# Patient Record
Sex: FEMALE | Race: BLACK OR AFRICAN AMERICAN | Employment: OTHER | URBAN - METROPOLITAN AREA
[De-identification: names, ages, dates, MRNs, and addresses within clinical notes are randomized per-mention and may not be internally consistent; named-entity substitution may affect disease eponyms.]

---

## 2017-07-20 ENCOUNTER — OFFICE VISIT (OUTPATIENT)
Dept: SURGERY | Age: 77
End: 2017-07-20

## 2017-07-20 VITALS
BODY MASS INDEX: 33.82 KG/M2 | WEIGHT: 203 LBS | HEART RATE: 76 BPM | SYSTOLIC BLOOD PRESSURE: 134 MMHG | RESPIRATION RATE: 18 BRPM | TEMPERATURE: 97 F | HEIGHT: 65 IN | OXYGEN SATURATION: 98 % | DIASTOLIC BLOOD PRESSURE: 60 MMHG

## 2017-07-20 DIAGNOSIS — Z12.11 ENCOUNTER FOR SCREENING COLONOSCOPY: Primary | ICD-10-CM

## 2017-07-20 RX ORDER — HYDROCHLOROTHIAZIDE 12.5 MG/1
12.5 TABLET ORAL DAILY
COMMUNITY
End: 2019-02-06 | Stop reason: SDUPTHER

## 2017-07-20 RX ORDER — PRAVASTATIN SODIUM 20 MG/1
20 TABLET ORAL
COMMUNITY
End: 2019-02-06 | Stop reason: SDUPTHER

## 2017-07-20 RX ORDER — BUDESONIDE AND FORMOTEROL FUMARATE DIHYDRATE 160; 4.5 UG/1; UG/1
2 AEROSOL RESPIRATORY (INHALATION) 2 TIMES DAILY
COMMUNITY
End: 2019-05-20 | Stop reason: SDUPTHER

## 2017-07-20 RX ORDER — POLYETHYLENE GLYCOL 3350, SODIUM SULFATE ANHYDROUS, SODIUM BICARBONATE, SODIUM CHLORIDE, POTASSIUM CHLORIDE 236; 22.74; 6.74; 5.86; 2.97 G/4L; G/4L; G/4L; G/4L; G/4L
POWDER, FOR SOLUTION ORAL
Qty: 1 BOTTLE | Refills: 0 | Status: SHIPPED | OUTPATIENT
Start: 2017-07-20 | End: 2019-01-15

## 2017-07-20 RX ORDER — ALBUTEROL SULFATE 90 UG/1
AEROSOL, METERED RESPIRATORY (INHALATION)
COMMUNITY
End: 2019-05-20 | Stop reason: SDUPTHER

## 2017-07-20 NOTE — PROGRESS NOTES
Colon Screen    Patient: Toni Perez MRN: [id number]  SSN: [id number]    YOB: 1940  Age: 77 y.o. Sex: female        Subjective:   Toni Perez presents for colon screening. PCP is Venessa Dover MD. Patient denies rectal pain or bleeding. Abdominal surgeries as described below, specifically cholecystectomy. Patient has a bowel movement 1-2 per day. Patient has intentionally lost 30 over 2 years. She denies changes in bowel habits. Patient has no known family history of colon cancer. Last colonoscopy was 5 years at [facility]. There were no polyps found on that exam, however she did have polyps on a previous colonoscopy. Allergies   Allergen Reactions    Atenolol Shortness of Breath    Pcn [Penicillins] Rash    Vicodin [Hydrocodone-Acetaminophen] Nausea Only       Past Medical History:   Diagnosis Date    Arthritis     Calculus of kidney     HTN (hypertension)     Hypercholesterolemia      Past Surgical History:   Procedure Laterality Date    HX CHOLECYSTECTOMY      HX COLONOSCOPY      HX WISDOM TEETH EXTRACTION  1980's      Family History   Problem Relation Age of Onset    Hypertension Brother      Social History   Substance Use Topics    Smoking status: Former Smoker     Quit date: 1/1/1994    Smokeless tobacco: Never Used    Alcohol use No      Prior to Admission medications    Medication Sig Start Date End Date Taking? Authorizing Provider   albuterol (PROAIR HFA) 90 mcg/actuation inhaler Take  by inhalation. Yes Historical Provider   pravastatin (PRAVACHOL) 20 mg tablet Take 20 mg by mouth nightly. Yes Historical Provider   budesonide-formoterol (SYMBICORT) 160-4.5 mcg/actuation HFA inhaler Take 2 Puffs by inhalation two (2) times a day. Yes Historical Provider   hydroCHLOROthiazide (HYDRODIURIL) 12.5 mg tablet Take 12.5 mg by mouth daily.    Yes Historical Provider   ergocalciferol, vitamin d2, 50,000 unit Tab Take  by mouth every seven (7) days.   Yes Historical Provider   alendronate-vitamin d3 70-2,800 mg-unit per tablet Take 1 Tab by mouth every seven (7) days. Yes Historical Provider   aspirin 81 mg chewable tablet Take 81 mg by mouth daily. Patient took last dose March 04, 2013 and will her surgeon's instruction not to take it anymore before surgery. Yes Historical Provider   calcium-cholecalciferol, d3, (CALCIUM 600 + D) 600-125 mg-unit Tab Take  by mouth. Yes Historical Provider   omega-3 fatty acids-vitamin e (FISH OIL) 1,000 mg cap Take 1 Cap by mouth. Patient took last dose March 04, 2013 and will her surgeon's instruction not to take it anymore before surgery. Yes Historical Provider   multivitamin (ONE A DAY) tablet Take 1 Tab by mouth daily. Yes Historical Provider   ascorbic acid (VITAMIN C) 500 mg tablet Take  by mouth. Yes Historical Provider   acetaminophen (TYLENOL) 325 mg tablet Take  by mouth every four (4) hours as needed. Yes Historical Provider   promethazine (PHENERGAN) 25 mg tablet Take 1 Tab by mouth every six (6) hours as needed for Nausea. 3/11/13   Wade Cantu MD   diltiazem CD (DILT-CD) 240 mg ER capsule Take 240 mg by mouth daily. Patient takes medicine daily every morning. Instructed pt to take this medicine early AM on March 11, 2013 with a small sip of water. Indications: HYPERTENSION    Historical Provider   simvastatin (ZOCOR) 20 mg tablet Take  by mouth nightly. Historical Provider          Review of Systems:  Gastrointestinal: negative      Risks colonoscopy described- colon injury, missed lesion, anesthesia problems, bleeding. Colonoscopy preparation reviewed in detail with patient and a copy of the instructions was provided to the patient.        Aishwarya Amador LPN  July 20, 0628  1:37 AM

## 2017-07-27 ENCOUNTER — HOSPITAL ENCOUNTER (OUTPATIENT)
Age: 77
Setting detail: OUTPATIENT SURGERY
Discharge: HOME | End: 2017-07-27
Attending: COLON & RECTAL SURGERY | Admitting: COLON & RECTAL SURGERY
Payer: MEDICARE

## 2017-07-27 VITALS
BODY MASS INDEX: 32.99 KG/M2 | TEMPERATURE: 99.3 F | HEIGHT: 65 IN | DIASTOLIC BLOOD PRESSURE: 67 MMHG | HEART RATE: 70 BPM | OXYGEN SATURATION: 95 % | WEIGHT: 198 LBS | RESPIRATION RATE: 21 BRPM | SYSTOLIC BLOOD PRESSURE: 130 MMHG

## 2017-07-27 PROCEDURE — 77030034690 HC DEV ENDO SNR RETRV STRC -B: Performed by: COLON & RECTAL SURGERY

## 2017-07-27 PROCEDURE — 74011250636 HC RX REV CODE- 250/636

## 2017-07-27 PROCEDURE — 74011250636 HC RX REV CODE- 250/636: Performed by: COLON & RECTAL SURGERY

## 2017-07-27 PROCEDURE — 88305 TISSUE EXAM BY PATHOLOGIST: CPT | Performed by: COLON & RECTAL SURGERY

## 2017-07-27 PROCEDURE — 99152 MOD SED SAME PHYS/QHP 5/>YRS: CPT | Performed by: COLON & RECTAL SURGERY

## 2017-07-27 PROCEDURE — 76040000019: Performed by: COLON & RECTAL SURGERY

## 2017-07-27 PROCEDURE — 77030031670 HC DEV INFL ENDOTEK BIG60 MRTM -B: Performed by: COLON & RECTAL SURGERY

## 2017-07-27 RX ORDER — NALOXONE HYDROCHLORIDE 0.4 MG/ML
0.4 INJECTION, SOLUTION INTRAMUSCULAR; INTRAVENOUS; SUBCUTANEOUS
Status: CANCELLED | OUTPATIENT
Start: 2017-07-27 | End: 2017-07-27

## 2017-07-27 RX ORDER — SODIUM CHLORIDE 0.9 % (FLUSH) 0.9 %
5-10 SYRINGE (ML) INJECTION EVERY 8 HOURS
Status: CANCELLED | OUTPATIENT
Start: 2017-07-27 | End: 2017-07-27

## 2017-07-27 RX ORDER — MIDAZOLAM HYDROCHLORIDE 1 MG/ML
INJECTION, SOLUTION INTRAMUSCULAR; INTRAVENOUS
Status: DISCONTINUED
Start: 2017-07-27 | End: 2017-07-27 | Stop reason: HOSPADM

## 2017-07-27 RX ORDER — DEXTROMETHORPHAN/PSEUDOEPHED 2.5-7.5/.8
1.2 DROPS ORAL
Status: CANCELLED | OUTPATIENT
Start: 2017-07-27

## 2017-07-27 RX ORDER — TRAMADOL HYDROCHLORIDE 50 MG/1
TABLET ORAL
Refills: 0 | COMMUNITY
Start: 2017-06-29

## 2017-07-27 RX ORDER — SODIUM CHLORIDE 9 MG/ML
25 INJECTION, SOLUTION INTRAVENOUS CONTINUOUS
Status: CANCELLED | OUTPATIENT
Start: 2017-07-27

## 2017-07-27 RX ORDER — MIDAZOLAM HYDROCHLORIDE 1 MG/ML
.25-5 INJECTION, SOLUTION INTRAMUSCULAR; INTRAVENOUS
Status: CANCELLED | OUTPATIENT
Start: 2017-07-27 | End: 2017-07-27

## 2017-07-27 RX ORDER — MIDAZOLAM HYDROCHLORIDE 5 MG/ML
INJECTION INTRAMUSCULAR; INTRAVENOUS AS NEEDED
Status: DISCONTINUED | OUTPATIENT
Start: 2017-07-27 | End: 2017-07-27 | Stop reason: HOSPADM

## 2017-07-27 RX ORDER — ATROPINE SULFATE 0.1 MG/ML
0.5 INJECTION INTRAVENOUS
Status: CANCELLED | OUTPATIENT
Start: 2017-07-27 | End: 2017-07-27

## 2017-07-27 RX ORDER — SODIUM CHLORIDE 0.9 % (FLUSH) 0.9 %
5-10 SYRINGE (ML) INJECTION AS NEEDED
Status: CANCELLED | OUTPATIENT
Start: 2017-07-27 | End: 2017-07-27

## 2017-07-27 RX ORDER — FLUMAZENIL 0.1 MG/ML
0.2 INJECTION INTRAVENOUS
Status: CANCELLED | OUTPATIENT
Start: 2017-07-27 | End: 2017-07-27

## 2017-07-27 RX ORDER — EPINEPHRINE 0.1 MG/ML
1 INJECTION INTRACARDIAC; INTRAVENOUS
Status: CANCELLED | OUTPATIENT
Start: 2017-07-27 | End: 2017-07-27

## 2017-07-27 RX ORDER — SODIUM CHLORIDE 9 MG/ML
75 INJECTION, SOLUTION INTRAVENOUS CONTINUOUS
Status: CANCELLED | OUTPATIENT
Start: 2017-07-27 | End: 2017-07-27

## 2017-07-27 NOTE — DISCHARGE INSTRUCTIONS
Colonoscopy Discharge Instructions       Zaria Perez  [id number]  1940      COLONOSCOPY FINDINGS:  Your colonoscopy showed: 1. One colon polyp which was completely removed. 2. Otherwise normal examination. FOLLOW UP RECOMMENDATIONS:   Dr. Simone Magallanes will contact you with your results. Dr. Simone Magallanes will recommend your next colonoscopy after the Pathology results are available. DISCOMFORT:  If you have redness at your IV site- apply warm compress to area; if redness or soreness persist- contact your physician  There may be a slight amount of blood passed from the rectum, more than a teaspoon of bright red blood is not expected - contact your physician  Gaseous discomfort is common- walking, belching will help relieve any gas pains. If discomfort persist- contact your physician    DIET:   Regular diet. ACTIVITY:  You may resume your normal daily activities, however, it is recommended that you spend the remainder of the day resting - avoiding any strenuous activities. You may not operate a vehicle for 24 hours  You may not engage in an occupation involving machinery or appliances for rest of today  You may not drink alcoholic beverages for at least 24 hours  Avoid making any critical decisions for at least 24 hour    CALL M.D. ANY SIGN OF:   Increasing pain, nausea, vomiting  Abdominal distension (swelling)  New increased bleeding   Fever or chills  Pain in chest area or shortness of breath      Aleena Leal MD, FACS, FASCRS  Colon and Rectal Surgery  [facility]  Office [phone]  Fax     [phone]         Colonoscopy: What to Expect at Home  Your Recovery  After you have a colonoscopy, you will stay at the clinic for 1 to 2 hours until the medicines wear off. Then you can go home. But you will need to arrange for a ride. Your doctor will tell you when you can eat and do your other usual activities.   Your doctor will talk to you about when you will need your next colonoscopy. Your doctor can help you decide how often you need to be checked. This will depend on the results of your test and your risk for colorectal cancer. After the test, you may be bloated or have gas pains. You may need to pass gas. If a biopsy was done or a polyp was removed, you may have streaks of blood in your stool (feces) for a few days. This care sheet gives you a general idea about how long it will take for you to recover. But each person recovers at a different pace. Follow the steps below to get better as quickly as possible. How can you care for yourself at home? Activity  · Rest when you feel tired. · You can do your normal activities when it feels okay to do so. Diet  · Follow your doctor's directions for eating. · Unless your doctor has told you not to, drink plenty of fluids. This helps to replace the fluids that were lost during the colon prep. · Do not drink alcohol. Medicines  · Your doctor will tell you if and when you can restart your medicines. He or she will also give you instructions about taking any new medicines. · If you take blood thinners, such as warfarin (Coumadin), clopidogrel (Plavix), or aspirin, be sure to talk to your doctor. He or she will tell you if and when to start taking those medicines again. Make sure that you understand exactly what your doctor wants you to do. · If polyps were removed or a biopsy was done during the test, your doctor may tell you not to take aspirin or other anti-inflammatory medicines for a few days. These include ibuprofen (Advil, Motrin) and naproxen (Aleve). Other instructions  · For your safety, do not drive or operate machinery until the medicine wears off and you can think clearly. Your doctor may tell you not to drive or operate machinery until the day after your test.  · Do not sign legal documents or make major decisions until the medicine wears off and you can think clearly.  The anesthesia can make it hard for you to fully understand what you are agreeing to. Follow-up care is a key part of your treatment and safety. Be sure to make and go to all appointments, and call your doctor if you are having problems. It's also a good idea to know your test results and keep a list of the medicines you take. When should you call for help? Call 911 anytime you think you may need emergency care. For example, call if:  · You passed out (lost consciousness). · You pass maroon or bloody stools. · You have severe belly pain. Call your doctor now or seek immediate medical care if:  · Your stools are black and tarlike. · Your stools have streaks of blood, but you did not have a biopsy or any polyps removed. · You have belly pain, or your belly is swollen and firm. · You vomit. · You have a fever. · You are very dizzy. Watch closely for changes in your health, and be sure to contact your doctor if you have any problems. Where can you learn more? Go to http://bao-humza.info/. Enter E264 in the search box to learn more about \"Colonoscopy: What to Expect at Home. \"  Current as of: August 9, 2016  Content Version: 11.3  © 3395-7931 [employer]. Care instructions adapted under license by [employer] (which disclaims liability or warranty for this information). If you have questions about a medical condition or this instruction, always ask your healthcare professional. [place] [address] any warranty or liability for your use of this information.     DISCHARGE SUMMARY from Nurse    The following personal items are in your possession at time of discharge:    Dental Appliances: Lowers, Uppers  Visual Aid: Glasses, With patient                            PATIENT INSTRUCTIONS:    After general anesthesia or intravenous sedation, for 24 hours or while taking prescription Narcotics:  · Limit your activities  · Do not drive and operate hazardous machinery  · Do not make important personal or business decisions  · Do  not drink alcoholic beverages  · If you have not urinated within 8 hours after discharge, please contact your surgeon on call. Report the following to your surgeon:  · Excessive pain, swelling, redness or odor of or around the surgical area  · Temperature over 100.5  · Nausea and vomiting lasting longer than 4 hours or if unable to take medications  · Any signs of decreased circulation or nerve impairment to extremity: change in color, persistent  numbness, tingling, coldness or increase pain  · Any questions        What to do at Home:  These are general instructions for a healthy lifestyle:    No smoking/ No tobacco products/ Avoid exposure to second hand smoke    Surgeon General's Warning:  Quitting smoking now greatly reduces serious risk to your health. Obesity, smoking, and sedentary lifestyle greatly increases your risk for illness    A healthy diet, regular physical exercise & weight monitoring are important for maintaining a healthy lifestyle    You may be retaining fluid if you have a history of heart failure or if you experience any of the following symptoms:  Weight gain of 3 pounds or more overnight or 5 pounds in a week, increased swelling in our hands or feet or shortness of breath while lying flat in bed. Please call your doctor as soon as you notice any of these symptoms; do not wait until your next office visit. Recognize signs and symptoms of STROKE:    F-face looks uneven    A-arms unable to move or move unevenly    S-speech slurred or non-existent    T-time-call 911 as soon as signs and symptoms begin-DO NOT go       Back to bed or wait to see if you get better-TIME IS BRAIN. Warning Signs of HEART ATTACK     Call 911 if you have these symptoms:   Chest discomfort. Most heart attacks involve discomfort in the center of the chest that lasts more than a few minutes, or that goes away and comes back.  It can feel like uncomfortable pressure, squeezing, fullness, or pain.  Discomfort in other areas of the upper body. Symptoms can include pain or discomfort in one or both arms, the back, neck, jaw, or stomach.  Shortness of breath with or without chest discomfort.  Other signs may include breaking out in a cold sweat, nausea, or lightheadedness. Don't wait more than five minutes to call 911 - MINUTES MATTER! Fast action can save your life. Calling 911 is almost always the fastest way to get lifesaving treatment. Emergency Medical Services staff can begin treatment when they arrive -- up to an hour sooner than if someone gets to the hospital by car. The discharge information has been reviewed with the patient. The patient verbalized understanding. Discharge medications reviewed with the patient and appropriate educational materials and side effects teaching were provided. Patient armband removed and given to patient to take home.   Patient was informed of the privacy risks if armband lost or stolen

## 2017-07-27 NOTE — H&P
[facility]  [address] [address], [facility]        Colon and Rectal Surgery History and Physical    Subjective:      Ruslan Perez is a 77 y.o. female who presents for colon screening. PCP is Deborah Moreira MD. Patient denies rectal pain or bleeding. Abdominal surgeries as described below, specifically cholecystectomy. Patient has a bowel movement 1-2 per day. Patient has intentionally lost 30 over 2 years. She denies changes in bowel habits. Patient has no known family history of colon cancer.      Last colonoscopy was 5 years at [facility]. There were no polyps found on that exam, however she did have polyps on a previous colonoscopy. Patient Active Problem List    Diagnosis Date Noted    Cholelithiasis 03/12/2013    Gallbladder attack 01/23/2013     Past Medical History:   Diagnosis Date    Arthritis     Calculus of kidney     HTN (hypertension)     Hypercholesterolemia       Past Surgical History:   Procedure Laterality Date    HX CHOLECYSTECTOMY      HX COLONOSCOPY      HX WISDOM TEETH EXTRACTION  1980's      Social History   Substance Use Topics    Smoking status: Former Smoker     Quit date: 1/1/1994    Smokeless tobacco: Never Used    Alcohol use No      Family History   Problem Relation Age of Onset    Hypertension Brother       Prior to Admission medications    Medication Sig Start Date End Date Taking? Authorizing Provider   pravastatin (PRAVACHOL) 20 mg tablet Take 20 mg by mouth nightly. Yes Historical Provider   budesonide-formoterol (SYMBICORT) 160-4.5 mcg/actuation HFA inhaler Take 2 Puffs by inhalation two (2) times a day. Yes Historical Provider   hydroCHLOROthiazide (HYDRODIURIL) 12.5 mg tablet Take 12.5 mg by mouth daily.    Yes Historical Provider   PEG 3350-Electrolytes (GO-LYTELY) 024-12.51-6.95 -5.86 gram suspension Take as directed for bowel prep  Indications: BOWEL EVACUATION 7/20/17  Yes Mary Carmen North MD diltiazem CD (DILT-CD) 240 mg ER capsule Take 240 mg by mouth daily. Patient takes medicine daily every morning. Instructed pt to take this medicine early AM on March 11, 2013 with a small sip of water. Indications: HYPERTENSION   Yes Historical Provider   aspirin 81 mg chewable tablet Take 81 mg by mouth daily. Patient took last dose March 04, 2013 and will her surgeon's instruction not to take it anymore before surgery. Yes Historical Provider   traMADol (ULTRAM) 50 mg tablet take 1 tablet by mouth once daily if needed 6/29/17   Historical Provider   albuterol (PROAIR HFA) 90 mcg/actuation inhaler Take  by inhalation. Historical Provider   promethazine (PHENERGAN) 25 mg tablet Take 1 Tab by mouth every six (6) hours as needed for Nausea. 3/11/13   Marylin Berg MD   alendronate-vitamin d3 45-2,834 mg-unit per tablet Take 1 Tab by mouth every seven (7) days. Historical Provider   calcium-cholecalciferol, d3, (CALCIUM 600 + D) 600-125 mg-unit Tab Take  by mouth. Historical Provider   omega-3 fatty acids-vitamin e (FISH OIL) 1,000 mg cap Take 1 Cap by mouth. Patient took last dose March 04, 2013 and will her surgeon's instruction not to take it anymore before surgery. Historical Provider   multivitamin (ONE A DAY) tablet Take 1 Tab by mouth daily. Historical Provider   ascorbic acid (VITAMIN C) 500 mg tablet Take  by mouth. Historical Provider   acetaminophen (TYLENOL) 325 mg tablet Take  by mouth every four (4) hours as needed.       Historical Provider     Current Facility-Administered Medications   Medication Dose Route Frequency    midazolam (VERSED) 1 mg/mL injection        meperidine (DEMEROL) 100 mg/ml injection         Allergies   Allergen Reactions    Atenolol Shortness of Breath    Pcn [Penicillins] Rash    Vicodin [Hydrocodone-Acetaminophen] Nausea Only            Objective:     Visit Vitals    /77    Pulse 79    Temp 99.3 °F (37.4 °C)    Resp 16    Ht 5' 5\" (1.651 m)    Wt 89.8 kg (198 lb)    SpO2 95%    Breastfeeding No    BMI 32.95 kg/m2        Physical Exam:   GENERAL: alert, cooperative, no distress, appears stated age  LUNG: clear to auscultation bilaterally  HEART: regular rate and rhythm, S1, S2 normal, no murmur, click, rub or gallop  ABDOMEN: soft, non-tender. Bowel sounds normal. No masses,  no organomegaly       Assessment:     Antonio Perez is a 77 y.o. female who requires colonoscopy for   Personal history of colon polyps (screening only). Plan:     1. I recommend proceeding with colonoscopy. The patient was in full agreement and was eager to proceed. 2. I discussed the details of the colonoscopy procedure. The risks of colonoscopy were discussed including colon injury/perforation, anesthesia issues, bleeding, and the possibility of incomplete examination. The patient was willing to accept these risks and proceed with the examination. All questions were answered to the patient's satisfaction.          Parth Muñoz MD, FACS, FASCRS  Colon and Rectal Surgery  [facility]  Office [phone]  Fax     [phone]  7/27/2017  12:05 PM

## 2017-07-27 NOTE — PROCEDURES
Colonoscopy Procedure Note      Geremias Perez  1940  [id number]                Date of Procedure: 7/27/2017    Indications:    Personal history of colon polyps (screening only)     Preoperative diagnosis: COLON CANCER SCREENING Z12.11      Postoperative diagnosis: polyp    Title of Procedure:  Colonoscopy with polyopectomy    :  Monica Booker MD    Assistant(s): Endoscopy Technician-1: Quirino Michaels  Endoscopy RN-1: Kimberly Montenegro RN    Referring Source:  Aaron Ricketts MD    Sedation:  Demerol 50 mg IV,  Versed 4 mg IV      ASA Class: ASA 3 - Severe systemic disease but compensated        Procedure Details:    Prior to the procedure, a history and physical were performed. The patients medications, allergies and sensitivities were reviewed and all questions were answered. After informed consent was obtained for the procedure, with all risks and benefits of procedure explained. The patient was taken to the endoscopy suite and placed in the left lateral decubitus position. Patient identification and proposed procedure were verified prior to the procedure by the nurse and I. Following the  satisfactory administration of sedation,  the anus was inspected and appeared within normal limits. Digital rectal examination revealed Normal sphincter tone and squeeze pressure. Palpation revealed No Masses. Next the Olympus video colonoscope was introduced through the anus and advanced to cecum, which was identified by the ileocecal valve and appendiceal orifice, terminal ileum. The quality of preparation was good. The terminal ileum was visualized. The colonoscope was then slowly withdrawn and the mucosa carefully examined for any abnormalities. Cecal withdrawl time was greater than six minutes. The patient tolerated the procedure well.       Findings:   Rectum: normal  Sigmoid: normal  Descending Colon: 1  Pedunculated polyp(s), the largest 5 mm in size;  Transverse Colon: normal  Ascending Colon: normal  Cecum: normal  Terminal Ileum: normal    Interventions:  1 complete polypectomy was performed using hot snare  and the polyp was retrieved    Specimen Removed:   ID Type Source Tests Collected by Time Destination   1 : (hot snare) polyp Preservative Colon, Descending  Valarie Malcolm MD 7/27/2017 1327 Pathology        Complications: None. EBL:  None. Impression:    polyp     Recommendations: -Await pathology. Resume normal medication(s). Discharge Disposition:  Home in the company of a  when able to ambulate.         Valarie Malcolm MD, FACS, FASCRS  Colon and Rectal Surgery  [facility]  Office [phone]  Fax     [phone]  7/27/2017  1:45 PM       [facility]  [address] [place] [address]

## 2018-10-15 ENCOUNTER — HOSPITAL ENCOUNTER (OUTPATIENT)
Dept: MAMMOGRAPHY | Age: 78
Discharge: HOME | End: 2018-10-15
Payer: MEDICARE

## 2018-10-15 DIAGNOSIS — Z12.31 VISIT FOR SCREENING MAMMOGRAM: ICD-10-CM

## 2018-10-15 PROCEDURE — 77063 BREAST TOMOSYNTHESIS BI: CPT

## 2019-01-14 ENCOUNTER — DOCUMENTATION ONLY (OUTPATIENT)
Dept: INTERNAL MEDICINE CLINIC | Age: 79
End: 2019-01-14

## 2019-01-14 NOTE — PROGRESS NOTES
Pharmacy Note: Immunization Update    Patient: Zaria Perez (80 y. o., 1940)     Patient's immunization history was updated to reflect information contained in the [place] and/or outside immunization/pharmacy records were reconciled within [facility]. Health Maintenance schedule updated when appropriate.     Current immunizations now reflect:       Immunization History   Administered Date(s) Administered    Influenza High Dose Vaccine PF 08/16/2016    Pneumococcal Conjugate (PCV-13) 08/16/2016    Pneumococcal Polysaccharide (PPSV-23) 12/31/2017       Chelo Bean, PharmD, BCACP

## 2019-01-15 ENCOUNTER — OFFICE VISIT (OUTPATIENT)
Dept: INTERNAL MEDICINE CLINIC | Age: 79
End: 2019-01-15

## 2019-01-15 VITALS
DIASTOLIC BLOOD PRESSURE: 78 MMHG | RESPIRATION RATE: 18 BRPM | SYSTOLIC BLOOD PRESSURE: 142 MMHG | HEIGHT: 65 IN | WEIGHT: 203.4 LBS | BODY MASS INDEX: 33.89 KG/M2 | HEART RATE: 77 BPM | OXYGEN SATURATION: 96 % | TEMPERATURE: 97.1 F

## 2019-01-15 DIAGNOSIS — M25.561 CHRONIC PAIN OF BOTH KNEES: ICD-10-CM

## 2019-01-15 DIAGNOSIS — M81.0 AGE-RELATED OSTEOPOROSIS WITHOUT CURRENT PATHOLOGICAL FRACTURE: ICD-10-CM

## 2019-01-15 DIAGNOSIS — M25.562 CHRONIC PAIN OF BOTH KNEES: ICD-10-CM

## 2019-01-15 DIAGNOSIS — I10 ESSENTIAL HYPERTENSION: Primary | ICD-10-CM

## 2019-01-15 DIAGNOSIS — M79.671 HEEL PAIN, BILATERAL: ICD-10-CM

## 2019-01-15 DIAGNOSIS — G47.9 SLEEP DISORDER: ICD-10-CM

## 2019-01-15 DIAGNOSIS — J44.9 CHRONIC OBSTRUCTIVE PULMONARY DISEASE, UNSPECIFIED COPD TYPE (HCC): ICD-10-CM

## 2019-01-15 DIAGNOSIS — G89.29 CHRONIC PAIN OF BOTH KNEES: ICD-10-CM

## 2019-01-15 DIAGNOSIS — M79.672 HEEL PAIN, BILATERAL: ICD-10-CM

## 2019-01-15 DIAGNOSIS — E78.2 MIXED HYPERLIPIDEMIA: ICD-10-CM

## 2019-01-15 NOTE — PROGRESS NOTES
HISTORY OF PRESENT ILLNESS Bee Perez is a 69 y.o. female. 70 yo female here to establish care, f/u of HTN. Energy levels decreased some over past year. Worse on days when she is busy. Wakes refreshed but does notes she only sleeps 3 hours a night chronically. Wakes early 3am but does watch tv at night. Has been taking B12. Has not noted this doing much. Reports recent labs at [facility] but does not know results. HTN BP well controlled at home BP usually 130/70. Arthritis knees. Seldom use of tramadol Has heel pain - new sx, worse when she first gets out of bed but better when she is up walking. COPD - stable on symbicort. OP - last DEXA last year at [facility]. HLD: on pravachol . Labs down last month and she reports this has been controlled. Establish Care Pertinent negatives include no chest pain, no headaches and no shortness of breath. Review of Systems Constitutional: Negative for chills, fever and weight loss. HENT: Negative for congestion and ear pain. Eyes: Negative for blurred vision and pain. Respiratory: Negative for cough and shortness of breath. Cardiovascular: Negative for chest pain, palpitations and leg swelling. Gastrointestinal: Negative for nausea and vomiting. Genitourinary: Negative for frequency and urgency. Musculoskeletal: Positive for joint pain. Negative for myalgias. Skin: Negative for itching and rash. Neurological: Negative for dizziness, tingling and headaches. Psychiatric/Behavioral: Negative for depression. The patient is not nervous/anxious. Past Medical History:  
Diagnosis Date  Arthritis  Calculus of kidney  HTN (hypertension)  Hypercholesterolemia  Menopause Past Surgical History:  
Procedure Laterality Date  COLONOSCOPY N/A 7/27/2017 COLONOSCOPY performed by Ginette Orlando MD at [address]   HX COLONOSCOPY    
 HX WISDOM TEETH EXTRACTION  1980's Current Outpatient Medications on File Prior to Visit Medication Sig Dispense Refill  traMADol (ULTRAM) 50 mg tablet take 1 tablet by mouth once daily if needed  0  
 albuterol (PROAIR HFA) 90 mcg/actuation inhaler Take  by inhalation.  pravastatin (PRAVACHOL) 20 mg tablet Take 20 mg by mouth nightly.  budesonide-formoterol (SYMBICORT) 160-4.5 mcg/actuation HFA inhaler Take 2 Puffs by inhalation two (2) times a day.  hydroCHLOROthiazide (HYDRODIURIL) 12.5 mg tablet Take 12.5 mg by mouth daily.  diltiazem CD (DILT-CD) 240 mg ER capsule Take 240 mg by mouth daily. Patient takes medicine daily every morning. Instructed pt to take this medicine early AM on 2013 with a small sip of water. Indications: HYPERTENSION  alendronate-vitamin d3 70-2,800 mg-unit per tablet Take 1 Tab by mouth every seven (7) days.  aspirin 81 mg chewable tablet Take 81 mg by mouth daily. Patient took last dose 2013 and will her surgeon's instruction not to take it anymore before surgery.  calcium-cholecalciferol, d3, (CALCIUM 600 + D) 600-125 mg-unit Tab Take  by mouth.  acetaminophen (TYLENOL) 325 mg tablet Take  by mouth every four (4) hours as needed. No current facility-administered medications on file prior to visit. Allergies Allergen Reactions  Atenolol Shortness of Breath  Pcn [Penicillins] Rash  Vicodin [Hydrocodone-Acetaminophen] Nausea Only Family History Problem Relation Age of Onset  Hypertension Brother Social History Tobacco Use  Smoking status: Former Smoker Last attempt to quit: 1994 Years since quittin.0  Smokeless tobacco: Never Used Substance Use Topics  Alcohol use: No  
 Drug use: No  
 
 
Physical Exam  
Constitutional: She appears well-developed and well-nourished. No distress.   
/78 (BP 1 Location: Left arm, BP Patient Position: Sitting)   Pulse 77   Temp 97.1 °F (36.2 °C) (Oral)   Resp 18   Ht 5' 5\" (1.651 m)   Wt 203 lb 6.4 oz (92.3 kg)   SpO2 96%   BMI 33.85 kg/m² HENT:  
Mouth/Throat: No oropharyngeal exudate. Eyes: EOM are normal. Right eye exhibits no discharge. Left eye exhibits no discharge. No scleral icterus. Neck: Neck supple. Cardiovascular: Normal rate, regular rhythm and normal heart sounds. Exam reveals no gallop and no friction rub. No murmur heard. Pulmonary/Chest: Effort normal and breath sounds normal. No respiratory distress. She has no wheezes. She has no rales. Abdominal: Soft. She exhibits no distension. There is no tenderness. There is no rebound and no guarding. Musculoskeletal: She exhibits no edema or tenderness. Lymphadenopathy:  
  She has no cervical adenopathy. Neurological: She is alert. She exhibits normal muscle tone. Skin: Skin is warm and dry. Psychiatric: She has a normal mood and affect. Lab Results Component Value Date/Time Sodium 140 03/16/2013 12:22 AM  
 Potassium 4.8 03/16/2013 12:22 AM  
 Chloride 101 03/16/2013 12:22 AM  
 CO2 35 (H) 03/16/2013 12:22 AM  
 Anion gap 4 03/16/2013 12:22 AM  
 Glucose 89 03/16/2013 12:22 AM  
 BUN 14 03/16/2013 12:22 AM  
 Creatinine 1.09 03/16/2013 12:22 AM  
 BUN/Creatinine ratio 13 03/16/2013 12:22 AM  
 GFR est AA >60 03/16/2013 12:22 AM  
 GFR est non-AA 53 (L) 03/16/2013 12:22 AM  
 Calcium 9.1 03/16/2013 12:22 AM  
 
ASSESSMENT and PLAN 
  ICD-10-CM ICD-9-CM 1. Essential hypertension I10 401.9 2. Age-related osteoporosis without current pathological fracture M81.0 733.01   
3. Chronic obstructive pulmonary disease, unspecified COPD type ([facility] [address] J44.9 496   
4. Mixed hyperlipidemia E78.2 272.2 5. Sleep disorder G47.9 780.50   
6. Chronic pain of both knees M25.561 719.46   
 M25.562 338.29 G89.29    
7. Heel pain, bilateral M79.671 729.5   
 M79.672 Old records requested. Will repeat labs after review. Will continue with current medication for now. Discussed sleep hygiene and provided info on AVS. Discussed possible plantar fasciitis and provided info on treatment/stretches.

## 2019-01-15 NOTE — PATIENT INSTRUCTIONS
Heel Pain: Care Instructions Your Care Instructions You can have heel pain from an injury or from everyday overuse, such as running or walking a lot. Plantar fasciitis is the most common cause of heel pain. In this condition, the bottom of your foot from the front of the heel to the base of the toes is sore and hard to walk on. Your heel can get better with rest, anti-inflammatory pain medicines, and stretching exercises. Follow-up care is a key part of your treatment and safety. Be sure to make and go to all appointments, and call your doctor if you are having problems. It's also a good idea to know your test results and keep a list of the medicines you take. How can you care for yourself at home? · Rest your feet often. Reduce your activity to a level that lets you avoid pain. If possible, do not run or walk on hard surfaces. · Take anti-inflammatory medicines to reduce heel pain. These include ibuprofen (Advil, Motrin) and naproxen (Aleve). Read and follow all instructions on the label. · Put ice or a cold pack on your heel for 10 to 20 minutes at a time. Try to do this every 1 to 2 hours for the next 3 days (when you are awake). Put a thin cloth between the ice and your skin. · If ice isn't helping after 2 or 3 days, try heat, such as a heating pad set on low. · If your doctor says it is okay, try these calf stretches. Tight calf muscles can cause heel pain or make it worse. ? Stand about 1 foot from a wall. Place the palms of both hands against the wall at chest level and lean forward against the wall. Put the leg you want to stretch about a step behind your other leg. Keep your back heel on the floor and bend your front knee until you feel a stretch in the back leg. Hold this position for 15 to 30 seconds. Repeat the exercise 2 to 4 times a session. Do 3 to 4 sessions a day. ? Sit down on the floor or a mat with your feet stretched in front of you. Roll up a towel lengthwise, and loop it over the ball of your foot. Holding the towel at both ends, gently pull the towel toward you to stretch your foot. Hold this position for 15 to 30 seconds. Repeat the exercise 2 to 4 times a session. Do 3 to 4 sessions a day. · Wear a night splint if your doctor suggests it. A night splint holds your foot with the toes pointed up. This position gives the bottom of your foot a constant, gentle stretch. · Wear shoes with good arch support. Athletic shoes or shoes with a well-cushioned sole are good choices. · Try a heel lift, heel cup or shoe insert (orthotic) to help cushion your heel. You can buy these at many shoe stores. Use them in both shoes, even if only one foot hurts. · Maintain a healthy weight. This puts less strain on your feet. When should you call for help? Call your doctor now or seek immediate medical care if: 
  · You have heel pain with fever, redness, or warmth in your heel.  
  · You have numbness or tingling in your heel.  
 Watch closely for changes in your health, and be sure to contact your doctor if: 
  · You cannot put weight on the sore foot.  
  · Your heel pain lasts more than 2 weeks. Where can you learn more? Go to http://bao-humza.info/. Enter S299 in the search box to learn more about \"Heel Pain: Care Instructions. \" Current as of: November 20, 2017 Content Version: 11.8 © 3549-1525 Healthwise, Incorporated. Care instructions adapted under license by [employer] (which disclaims liability or warranty for this information). If you have questions about a medical condition or this instruction, always ask your healthcare professional. [place] [address] any warranty or liability for your use of this information. Learning About Sleeping Well What does sleeping well mean? Sleeping well means getting enough sleep. How much sleep is enough varies among people. The number of hours you sleep is not as important as how you feel when you wake up. If you do not feel refreshed, you probably need more sleep. Another sign of not getting enough sleep is feeling tired during the day. The average total nightly sleep time is 7½ to 8 hours. Healthy adults may need a little more or a little less than this. Why is getting enough sleep important? Getting enough quality sleep is a basic part of good health. When your sleep suffers, your mood and your thoughts can suffer too. You may find yourself feeling more grumpy or stressed. Not getting enough sleep also can lead to serious problems, including injury, accidents, anxiety, and depression. What might cause poor sleeping? Many things can cause sleep problems, including: · Stress. Stress can be caused by fear about a single event, such as giving a speech. Or you may have ongoing stress, such as worry about work or school. · Depression, anxiety, and other mental or emotional conditions. · Changes in your sleep habits or surroundings. This includes changes that happen where you sleep, such as noise, light, or sleeping in a different bed. It also includes changes in your sleep pattern, such as having jet lag or working a late shift. · Health problems, such as pain, breathing problems, and restless legs syndrome. · Lack of regular exercise. How can you help yourself? Here are some tips that may help you sleep more soundly and wake up feeling more refreshed. Your sleeping area · Use your bedroom only for sleeping and sex. A bit of light reading may help you fall asleep. But if it doesn't, do your reading elsewhere in the house. Don't watch TV in bed. · Be sure your bed is big enough to stretch out comfortably, especially if you have a sleep partner. · Keep your bedroom quiet, dark, and cool. Use curtains, blinds, or a sleep mask to block out light. To block out noise, use earplugs, soothing music, or a \"white noise\" machine. Your evening and bedtime routine · Create a relaxing bedtime routine. You might want to take a warm shower or bath, listen to soothing music, or drink a cup of noncaffeinated tea. · Go to bed at the same time every night. And get up at the same time every morning, even if you feel tired. What to avoid · Limit caffeine (coffee, tea, caffeinated sodas) during the day, and don't have any for at least 4 to 6 hours before bedtime. · Don't drink alcohol before bedtime. Alcohol can cause you to wake up more often during the night. · Don't smoke or use tobacco, especially in the evening. Nicotine can keep you awake. · Don't take naps during the day, especially close to bedtime. · Don't lie in bed awake for too long. If you can't fall asleep, or if you wake up in the middle of the night and can't get back to sleep within 15 minutes or so, get out of bed and go to another room until you feel sleepy. · Don't take medicine right before bed that may keep you awake or make you feel hyper or energized. Your doctor can tell you if your medicine may do this and if you can take it earlier in the day. If you can't sleep · Imagine yourself in a peaceful, pleasant scene. Focus on the details and feelings of being in a place that is relaxing. · Get up and do a quiet or boring activity until you feel sleepy. · Don't drink any liquids after 6 p.m. if you wake up often because you have to go to the bathroom. Where can you learn more? Go to http://bao-humza.info/. Enter F155 in the search box to learn more about \"Learning About Sleeping Well. \" Current as of: December 7, 2017 Content Version: 11.8 © 2258-6484 Healthwise, [employer]. Care instructions adapted under license by "[employer] (which disclaims liability or warranty for this information).  If you have questions about a medical condition or this instruction, always ask your healthcare professional. [employer] disclaims any warranty or liability for your use of this information.

## 2019-02-06 RX ORDER — PRAVASTATIN SODIUM 20 MG/1
20 TABLET ORAL
Qty: 90 TAB | Refills: 3 | Status: SHIPPED | OUTPATIENT
Start: 2019-02-06 | End: 2019-05-09 | Stop reason: SDUPTHER

## 2019-02-06 RX ORDER — HYDROCHLOROTHIAZIDE 12.5 MG/1
12.5 TABLET ORAL DAILY
Qty: 90 TAB | Refills: 3 | Status: SHIPPED | OUTPATIENT
Start: 2019-02-06 | End: 2019-05-09 | Stop reason: SDUPTHER

## 2019-03-07 NOTE — PROGRESS NOTES
ROOM # 16 Identified pt with two pt identifiers(name and ). Reviewed record in preparation for visit and have obtained necessary documentation. Chief Complaint Patient presents with Abril Zamudio [address] [place] preferred language for health care discussion is english/other. Is the patient using any DME equipment during OV? NO [address] [place] [address] [address]vd [place] is due for: 
Health Maintenance Due Topic  DTaP/Tdap/Td series (1 - Tdap)  Shingrix Vaccine Age 50> (1 of 2)  GLAUCOMA SCREENING Q2Y  Bone Densitometry (Dexa) Screening  Influenza Age 5 to Adult [address] Maintenance reviewed and discussed per provider Please order/place referral if appropriate. Advance Directive: 1. Do you have an advance directive in place? Patient Reply: NO 
 
2. If not, would you like material regarding how to put one in place? NO Coordination of Care: 1. Have you been to the ER, urgent care clinic since your last visit? Hospitalized since your last visit? NO 
 
2. Have you seen or consulted any other health care providers outside of the [address] [place] [address] since your last visit? Include any pap smears or colon screening. NO Patient is accompanied by self I have received verbal consent from [address] [place] [address] to discuss any/all medical information while they are present in the room. Learning Assessment: 
Learning Assessment 3/20/2013 3/4/2013 PRIMARY LEARNER Patient Patient HIGHEST LEVEL OF EDUCATION - PRIMARY LEARNER  GRADUATED HIGH SCHOOL OR GED GRADUATED HIGH SCHOOL OR GED  
BARRIERS PRIMARY LEARNER NONE NONE  
CO-LEARNER CAREGIVER No No  
PRIMARY LANGUAGE ENGLISH ENGLISH  
LEARNER PREFERENCE PRIMARY LISTENING LISTENING  
  DEMONSTRATION DEMONSTRATION  
LEARNING SPECIAL TOPICS - none ANSWERED BY patient self RELATIONSHIP SELF SELF Depression Screening: PHQ over the last two weeks 1/15/2019 Little interest or pleasure in doing things Not at all Trial of physical therapy   Feeling down, depressed, irritable, or hopeless Not at all Total Score PHQ 2 0 Abuse Screening: No flowsheet data found. Fall Risk Fall Risk Assessment, last 12 mths 1/15/2019 Able to walk? Yes Fall in past 12 months?  No

## 2019-05-09 RX ORDER — PRAVASTATIN SODIUM 20 MG/1
20 TABLET ORAL
Qty: 90 TAB | Refills: 3 | Status: SHIPPED | OUTPATIENT
Start: 2019-05-09

## 2019-05-09 RX ORDER — HYDROCHLOROTHIAZIDE 12.5 MG/1
12.5 TABLET ORAL DAILY
Qty: 90 TAB | Refills: 3 | Status: SHIPPED | OUTPATIENT
Start: 2019-05-09

## 2019-05-09 RX ORDER — DILTIAZEM HYDROCHLORIDE 240 MG/1
240 CAPSULE, COATED, EXTENDED RELEASE ORAL DAILY
Qty: 90 CAP | Refills: 3 | Status: SHIPPED | OUTPATIENT
Start: 2019-05-09

## 2019-05-09 NOTE — TELEPHONE ENCOUNTER
Requested Prescriptions     Pending Prescriptions Disp Refills    dilTIAZem CD (DILT-CD) 240 mg ER capsule       Sig: Take 1 Cap by mouth daily. Patient takes medicine daily every morning. Instructed pt to take this medicine early AM on March 11, 2013 with a small sip of water. Indications: high blood pressure    hydroCHLOROthiazide (HYDRODIURIL) 12.5 mg tablet 90 Tab 3     Sig: Take 1 Tab by mouth daily.  pravastatin (PRAVACHOL) 20 mg tablet 90 Tab 3     Sig: Take 1 Tab by mouth nightly.

## 2019-05-20 ENCOUNTER — TELEPHONE (OUTPATIENT)
Dept: INTERNAL MEDICINE CLINIC | Age: 79
End: 2019-05-20

## 2019-05-20 RX ORDER — ALBUTEROL SULFATE 90 UG/1
1 AEROSOL, METERED RESPIRATORY (INHALATION)
Qty: 1 INHALER | Refills: 3 | Status: SHIPPED | OUTPATIENT
Start: 2019-05-20

## 2019-05-20 RX ORDER — BUDESONIDE AND FORMOTEROL FUMARATE DIHYDRATE 160; 4.5 UG/1; UG/1
2 AEROSOL RESPIRATORY (INHALATION) 2 TIMES DAILY
Qty: 3 INHALER | Refills: 3 | Status: SHIPPED | OUTPATIENT
Start: 2019-05-20

## 2019-07-29 ENCOUNTER — OFFICE VISIT (OUTPATIENT)
Dept: INTERNAL MEDICINE CLINIC | Age: 79
End: 2019-07-29

## 2019-07-29 VITALS
HEART RATE: 73 BPM | WEIGHT: 186 LBS | SYSTOLIC BLOOD PRESSURE: 132 MMHG | HEIGHT: 65 IN | RESPIRATION RATE: 17 BRPM | TEMPERATURE: 97.7 F | OXYGEN SATURATION: 90 % | BODY MASS INDEX: 30.99 KG/M2 | DIASTOLIC BLOOD PRESSURE: 70 MMHG

## 2019-07-29 DIAGNOSIS — Z12.39 BREAST CANCER SCREENING: ICD-10-CM

## 2019-07-29 DIAGNOSIS — I10 BENIGN HYPERTENSION WITHOUT CHF: Primary | ICD-10-CM

## 2019-07-29 DIAGNOSIS — L98.9 SKIN LESION: ICD-10-CM

## 2019-07-29 DIAGNOSIS — E78.5 DYSLIPIDEMIA: ICD-10-CM

## 2019-07-29 NOTE — PROGRESS NOTES
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 She has skin lesion on right side of neck that has been there for 2 weeks. It is sore. It has gotten bigger and started a black mole and then got bigger. She said it started out as a skin tag and she placed bandaid on it. She then removed the bandaid and blood came out. There was no purulent drainage. She denies any chest pain, shortness of breath, abdominal pain, headaches or dizziness. She is suppose to get mammogram in 10/2019. Orders placed in [facility]. Past Medical History:   Diagnosis Date    Arthritis     bilateral knee pain    Calculus of kidney     HTN (hypertension)     Hypercholesterolemia     Menopause      Past Surgical History:   Procedure Laterality Date    COLONOSCOPY N/A 7/27/2017    COLONOSCOPY performed by Son Wright MD at [facility] ENDOSCOPY    HX CHOLECYSTECTOMY      HX COLONOSCOPY      HX WISDOM TEETH EXTRACTION  1980's     Current Outpatient Medications   Medication Sig    budesonide-formoterol (SYMBICORT) 160-4.5 mcg/actuation HFAA Take 2 Puffs by inhalation two (2) times a day.  albuterol (PROVENTIL HFA, VENTOLIN HFA, PROAIR HFA) 90 mcg/actuation inhaler Take 1 Puff by inhalation every four (4) hours as needed for Wheezing. Indications: chronic obstructive lung disease    dilTIAZem CD (DILT-CD) 240 mg ER capsule Take 1 Cap by mouth daily. Patient takes medicine daily every morning. Instructed pt to take this medicine early AM on March 11, 2013 with a small sip of water. Indications: high blood pressure    hydroCHLOROthiazide (HYDRODIURIL) 12.5 mg tablet Take 1 Tab by mouth daily.  pravastatin (PRAVACHOL) 20 mg tablet Take 1 Tab by mouth nightly.  docusate sodium (STOOL SOFTENER PO) Take  by mouth.     cyanocobalamin-cobamamide (B-12 PLUS) 5,000-100 mcg sublingual tablet 1 Tab by SubLINGual route daily.  traMADol (ULTRAM) 50 mg tablet take 1 tablet by mouth once daily if needed    alendronate-vitamin d3 70-2,800 mg-unit per tablet Take 1 Tab by mouth every seven (7) days.  aspirin 81 mg chewable tablet Take 81 mg by mouth daily. Patient took last dose March 04, 2013 and will her surgeon's instruction not to take it anymore before surgery.  calcium-cholecalciferol, d3, (CALCIUM 600 + D) 600-125 mg-unit Tab Take  by mouth.  acetaminophen (TYLENOL) 325 mg tablet Take  by mouth every four (4) hours as needed. No current facility-administered medications for this visit. Health Maintenance   Topic Date Due    DTaP/Tdap/Td series (1 - Tdap) 10/31/1962    Shingrix Vaccine Age 50> (1 of 2) 10/31/1991    GLAUCOMA SCREENING Q2Y  10/31/2006    MEDICARE YEARLY EXAM  10/15/2018    Influenza Age 9 to Adult  08/01/2019    COLONOSCOPY  07/27/2022    Bone Densitometry (Dexa) Screening  Completed    Pneumococcal 65+ years  Completed     Immunization History   Administered Date(s) Administered    Influenza High Dose Vaccine PF 08/16/2016    Pneumococcal Conjugate (PCV-13) 08/16/2016    Pneumococcal Polysaccharide (PPSV-23) 12/31/2017     No LMP recorded. Patient is postmenopausal.        Allergies and Intolerances: Allergies   Allergen Reactions    Atenolol Shortness of Breath    Pcn [Penicillins] Rash    Vicodin [Hydrocodone-Acetaminophen] Nausea Only       Family History:   Family History   Problem Relation Age of Onset    Hypertension Brother        Social History:   She  reports that she quit smoking about 25 years ago. She has never used smokeless tobacco.  She  reports that she does not drink alcohol.           OBJECTIVE:   Physical exam:   Visit Vitals  /70 (BP 1 Location: Left arm, BP Patient Position: Sitting)   Pulse 73   Temp 97.7 °F (36.5 °C) (Oral)   Resp 17   Ht 5' 5\" (1.651 m)   Wt 186 lb (84.4 kg)   SpO2 90%   BMI 30.95 kg/m²        Generally: Pleasant female in no acute distress  Cardiac Exam: regular, rate, and rhythm. Normal S1 and S2. No murmurs, gallops, or rubs  Pulmonary exam: Clear to auscultation bilaterally  Abdominal exam: Positive bowel sounds in all four quadrants, soft, nondistended, nontender  Extremities: 2+ dorsalis pedis pulses bilaterally. No pedal edema    bilaterally  Neck exam: on right side of neck is a 2 cm skin lesion that is indurated. It looks like it is a skin tag that got removed and reaction to skin or keloid or cyst.     LABS/RADIOLOGICAL TESTS:  Component      Latest Ref Rng & Units 4/15/2019 4/15/2019 4/15/2019 4/15/2019           9:48 AM  9:48 AM  9:48 AM  9:48 AM   WBC      3.4 - 10.8 x10E3/uL       RBC      3.77 - 5.28 x10E6/uL       HGB      11.1 - 15.9 g/dL       HCT      34.0 - 46.6 %       MCV      79 - 97 fL       MCH      26.6 - 33.0 pg       MCHC      31.5 - 35.7 g/dL       RDW      12.3 - 15.4 %       PLATELET      723 - 216 x10E3/uL       NEUTROPHILS      Not Estab. %       Lymphocytes      Not Estab. %       MONOCYTES      Not Estab. %       EOSINOPHILS      Not Estab. %       BASOPHILS      Not Estab. %       ABS. NEUTROPHILS      1.4 - 7.0 x10E3/uL       Abs Lymphocytes      0.7 - 3.1 x10E3/uL       ABS. MONOCYTES      0.1 - 0.9 x10E3/uL       ABS. EOSINOPHILS      0.0 - 0.4 x10E3/uL       ABS. BASOPHILS      0.0 - 0.2 x10E3/uL       IMMATURE GRANULOCYTES      Not Estab. %       ABS. IMM.  GRANS.      0.0 - 0.1 x10E3/uL       Glucose      65 - 99 mg/dL    81   BUN      8 - 27 mg/dL    13   Creatinine      0.57 - 1.00 mg/dL    1.03 (H)   GFR est non-AA      >59 mL/min/1.73    53 (L)   GFR est AA      >59 mL/min/1.73    61   BUN/Creatinine ratio      12 - 28    13   Sodium      134 - 144 mmol/L    142   Potassium      3.5 - 5.2 mmol/L    4.3   Chloride      96 - 106 mmol/L    94 (L)   CO2      20 - 29 mmol/L    33 (H)   Calcium      8.7 - 10.3 mg/dL    9.9   Protein, total      6.0 - 8.5 g/dL    7.7   Albumin      3.5 - 4.8 g/dL    4.3   GLOBULIN, TOTAL      1.5 - 4.5 g/dL    3.4   A-G Ratio      1.2 - 2.2    1.3   Bilirubin, total      0.0 - 1.2 mg/dL    0.6   Alk. phosphatase      39 - 117 IU/L    91   AST      0 - 40 IU/L    22   ALT (SGPT)      0 - 32 IU/L    21   Cholesterol, total      100 - 199 mg/dL   152    Triglyceride      0 - 149 mg/dL   75    HDL Cholesterol      >39 mg/dL   63    VLDL, calculated      5 - 40 mg/dL   15    LDL, calculated      0 - 99 mg/dL   74    TSH      0.450 - 4.500 uIU/mL  0.987     INTERPRETATION       Note        Component      Latest Ref Rng & Units 4/15/2019           9:48 AM   WBC      3.4 - 10.8 x10E3/uL 5.8   RBC      3.77 - 5.28 x10E6/uL 4.64   HGB      11.1 - 15.9 g/dL 13.2   HCT      34.0 - 46.6 % 39.8   MCV      79 - 97 fL 86   MCH      26.6 - 33.0 pg 28.4   MCHC      31.5 - 35.7 g/dL 33.2   RDW      12.3 - 15.4 % 13.7   PLATELET      232 - 654 x10E3/uL 222   NEUTROPHILS      Not Estab. % 54   Lymphocytes      Not Estab. % 37   MONOCYTES      Not Estab. % 8   EOSINOPHILS      Not Estab. % 1   BASOPHILS      Not Estab. % 0   ABS. NEUTROPHILS      1.4 - 7.0 x10E3/uL 3.1   Abs Lymphocytes      0.7 - 3.1 x10E3/uL 2.2   ABS. MONOCYTES      0.1 - 0.9 x10E3/uL 0.5   ABS. EOSINOPHILS      0.0 - 0.4 x10E3/uL 0.1   ABS. BASOPHILS      0.0 - 0.2 x10E3/uL 0.0   IMMATURE GRANULOCYTES      Not Estab. % 0   ABS. IMM.  GRANS.      0.0 - 0.1 x10E3/uL 0.0   Glucose      65 - 99 mg/dL    BUN      8 - 27 mg/dL    Creatinine      0.57 - 1.00 mg/dL    GFR est non-AA      >59 mL/min/1.73    GFR est AA      >59 mL/min/1.73    BUN/Creatinine ratio      12 - 28    Sodium      134 - 144 mmol/L    Potassium      3.5 - 5.2 mmol/L    Chloride      96 - 106 mmol/L    CO2      20 - 29 mmol/L    Calcium      8.7 - 10.3 mg/dL    Protein, total      6.0 - 8.5 g/dL    Albumin      3.5 - 4.8 g/dL    GLOBULIN, TOTAL      1.5 - 4.5 g/dL    A-G Ratio      1.2 - 2.2 Bilirubin, total      0.0 - 1.2 mg/dL    Alk. phosphatase      39 - 117 IU/L    AST      0 - 40 IU/L    ALT (SGPT)      0 - 32 IU/L    Cholesterol, total      100 - 199 mg/dL    Triglyceride      0 - 149 mg/dL    HDL Cholesterol      >39 mg/dL    VLDL, calculated      5 - 40 mg/dL    LDL, calculated      0 - 99 mg/dL    TSH      0.450 - 4.500 uIU/mL    INTERPRETATION            Previous labs    ASSESSMENT/PLAN:    1. Benign hypertension without CHF: stable. Continue diet, exercise and diltiazem and HCTZ. 2. Dyslipidemia:we will see what the labs show. Continue diet, exercise and pravastain.   -     METABOLIC PANEL, COMPREHENSIVE; Future  -     LIPID PANEL; Future    3. Skin lesion  -     REFERRAL TO DERMATOLOGY    4. Breast cancer screening  -     [facility] 3D CATHLEEN W MAMMO BI SCREENING INCL CAD; Future      5. Patient verbalized understanding and agreement with the plan. 6. Patient was given an after-visit summary. 7. She takes tramadol from time to time for arthritic pain in both knees. Follow-up and Dispositions    · Return in about 4 months (around 11/29/2019) for f/u DM/HTN/lipids, medicare wellness subsequent exam  or sooner if worsening symptoms.                Jim Swann M.D.

## 2019-07-29 NOTE — PROGRESS NOTES
ROOM # 2  Identified pt with two pt identifiers(name and ). Reviewed record in preparation for visit and have obtained necessary documentation. Chief Complaint   Patient presents with    Hypertension     f/u      [facility] preferred language for health care discussion is english/other. Is the patient using any DME equipment during OV? NO    Aloma Turner [place] is due for:  Health Maintenance Due   Topic    DTaP/Tdap/Td series (1 - Tdap)    Shingrix Vaccine Age 50> (1 of 2)    GLAUCOMA SCREENING Q2Y     [address] Maintenance reviewed and discussed per provider  Please order/place referral if appropriate. Advance Directive:  1. Do you have an advance directive in place? Patient Reply: NO    2. If not, would you like material regarding how to put one in place? NO    Coordination of Care:  1. Have you been to the ER, urgent care clinic since your last visit? Hospitalized since your last visit? NO    2. Have you seen or consulted any other health care providers outside of the [address] [place] [address] since your last visit? Include any pap smears or colon screening. NO    Patient is accompanied by self I have received verbal consent from [address] [place] [address] to discuss any/all medical information while they are present in the room.     Learning Assessment:  Learning Assessment 3/20/2013 3/4/2013   PRIMARY LEARNER Patient Patient   HIGHEST LEVEL OF EDUCATION - PRIMARY LEARNER  GRADUATED HIGH SCHOOL OR GED GRADUATED HIGH SCHOOL OR GED   BARRIERS PRIMARY LEARNER NONE NONE   CO-LEARNER CAREGIVER No No   PRIMARY LANGUAGE ENGLISH ENGLISH   LEARNER PREFERENCE PRIMARY LISTENING LISTENING     DEMONSTRATION DEMONSTRATION   LEARNING SPECIAL TOPICS - none   ANSWERED BY patient self   RELATIONSHIP SELF SELF     Depression Screening:  3 most recent PHQ Screens 4/15/2019 1/15/2019   Little interest or pleasure in doing things Not at all Not at all   Feeling down, depressed, irritable, or hopeless Not at all Not at all   Total Score PHQ 2 0 0     Abuse Screening:  n/i  Fall Risk  Fall Risk Assessment, last 12 mths 4/15/2019 1/15/2019   Able to walk? Yes Yes   Fall in past 12 months?  No No

## 2019-07-30 LAB
ALBUMIN SERPL-MCNC: 4.4 G/DL (ref 3.5–4.8)
ALBUMIN/GLOB SERPL: 1.2 {RATIO} (ref 1.2–2.2)
ALP SERPL-CCNC: 102 IU/L (ref 39–117)
ALT SERPL-CCNC: 22 IU/L (ref 0–32)
AST SERPL-CCNC: 30 IU/L (ref 0–40)
BILIRUB SERPL-MCNC: 0.6 MG/DL (ref 0–1.2)
BUN SERPL-MCNC: 14 MG/DL (ref 8–27)
BUN/CREAT SERPL: 17 (ref 12–28)
CALCIUM SERPL-MCNC: 9.7 MG/DL (ref 8.7–10.3)
CHLORIDE SERPL-SCNC: 91 MMOL/L (ref 96–106)
CHOLEST SERPL-MCNC: 147 MG/DL (ref 100–199)
CO2 SERPL-SCNC: 31 MMOL/L (ref 20–29)
CREAT SERPL-MCNC: 0.83 MG/DL (ref 0.57–1)
GLOBULIN SER CALC-MCNC: 3.6 G/DL (ref 1.5–4.5)
GLUCOSE SERPL-MCNC: 76 MG/DL (ref 65–99)
HDLC SERPL-MCNC: 56 MG/DL
INTERPRETATION, 910389: NORMAL
LDLC SERPL CALC-MCNC: 74 MG/DL (ref 0–99)
POTASSIUM SERPL-SCNC: 3.6 MMOL/L (ref 3.5–5.2)
PROT SERPL-MCNC: 8 G/DL (ref 6–8.5)
SODIUM SERPL-SCNC: 141 MMOL/L (ref 134–144)
TRIGL SERPL-MCNC: 85 MG/DL (ref 0–149)
VLDLC SERPL CALC-MCNC: 17 MG/DL (ref 5–40)

## 2019-08-20 ENCOUNTER — HOSPITAL ENCOUNTER (EMERGENCY)
Age: 79
Discharge: HOME | End: 2019-08-21
Attending: EMERGENCY MEDICINE | Admitting: EMERGENCY MEDICINE
Payer: MEDICARE

## 2019-08-20 VITALS
TEMPERATURE: 97.9 F | OXYGEN SATURATION: 94 % | BODY MASS INDEX: 30.95 KG/M2 | HEART RATE: 94 BPM | SYSTOLIC BLOOD PRESSURE: 163 MMHG | WEIGHT: 186 LBS | RESPIRATION RATE: 15 BRPM | DIASTOLIC BLOOD PRESSURE: 88 MMHG

## 2019-08-20 DIAGNOSIS — R20.0 SADDLE ANESTHESIA: Primary | ICD-10-CM

## 2019-08-20 LAB
ALBUMIN SERPL-MCNC: 3.7 G/DL (ref 3.4–5)
ALBUMIN/GLOB SERPL: 0.9 {RATIO} (ref 0.8–1.7)
ALP SERPL-CCNC: 101 U/L (ref 45–117)
ALT SERPL-CCNC: 28 U/L (ref 13–56)
ANION GAP SERPL CALC-SCNC: 2 MMOL/L (ref 3–18)
AST SERPL-CCNC: 23 U/L (ref 10–38)
BASOPHILS # BLD: 0 K/UL (ref 0–0.1)
BASOPHILS NFR BLD: 0 % (ref 0–2)
BILIRUB SERPL-MCNC: 0.3 MG/DL (ref 0.2–1)
BUN SERPL-MCNC: 21 MG/DL (ref 7–18)
BUN/CREAT SERPL: 16 (ref 12–20)
CALCIUM SERPL-MCNC: 9.3 MG/DL (ref 8.5–10.1)
CHLORIDE SERPL-SCNC: 101 MMOL/L (ref 100–111)
CO2 SERPL-SCNC: 38 MMOL/L (ref 21–32)
CREAT SERPL-MCNC: 1.35 MG/DL (ref 0.6–1.3)
DIFFERENTIAL METHOD BLD: NORMAL
EOSINOPHIL # BLD: 0.1 K/UL (ref 0–0.4)
EOSINOPHIL NFR BLD: 1 % (ref 0–5)
ERYTHROCYTE [DISTWIDTH] IN BLOOD BY AUTOMATED COUNT: 13 % (ref 11.6–14.5)
GLOBULIN SER CALC-MCNC: 4 G/DL (ref 2–4)
GLUCOSE SERPL-MCNC: 86 MG/DL (ref 74–99)
HCT VFR BLD AUTO: 40.8 % (ref 35–45)
HGB BLD-MCNC: 13.2 G/DL (ref 12–16)
LYMPHOCYTES # BLD: 2.1 K/UL (ref 0.9–3.6)
LYMPHOCYTES NFR BLD: 29 % (ref 21–52)
MCH RBC QN AUTO: 28.9 PG (ref 24–34)
MCHC RBC AUTO-ENTMCNC: 32.4 G/DL (ref 31–37)
MCV RBC AUTO: 89.5 FL (ref 74–97)
MONOCYTES # BLD: 0.4 K/UL (ref 0.05–1.2)
MONOCYTES NFR BLD: 6 % (ref 3–10)
NEUTS SEG # BLD: 4.8 K/UL (ref 1.8–8)
NEUTS SEG NFR BLD: 64 % (ref 40–73)
PLATELET # BLD AUTO: 194 K/UL (ref 135–420)
PMV BLD AUTO: 11 FL (ref 9.2–11.8)
POTASSIUM SERPL-SCNC: 4.4 MMOL/L (ref 3.5–5.5)
PROT SERPL-MCNC: 7.7 G/DL (ref 6.4–8.2)
RBC # BLD AUTO: 4.56 M/UL (ref 4.2–5.3)
SODIUM SERPL-SCNC: 141 MMOL/L (ref 136–145)
WBC # BLD AUTO: 7.4 K/UL (ref 4.6–13.2)

## 2019-08-20 PROCEDURE — 87086 URINE CULTURE/COLONY COUNT: CPT

## 2019-08-20 PROCEDURE — 85025 COMPLETE CBC W/AUTO DIFF WBC: CPT

## 2019-08-20 PROCEDURE — 80053 COMPREHEN METABOLIC PANEL: CPT

## 2019-08-20 PROCEDURE — 81001 URINALYSIS AUTO W/SCOPE: CPT

## 2019-08-20 PROCEDURE — 99282 EMERGENCY DEPT VISIT SF MDM: CPT

## 2019-08-20 RX ORDER — DEXAMETHASONE SODIUM PHOSPHATE 4 MG/ML
10 INJECTION, SOLUTION INTRA-ARTICULAR; INTRALESIONAL; INTRAMUSCULAR; INTRAVENOUS; SOFT TISSUE
Status: DISCONTINUED | OUTPATIENT
Start: 2019-08-20 | End: 2019-08-21 | Stop reason: HOSPADM

## 2019-08-21 ENCOUNTER — APPOINTMENT (OUTPATIENT)
Dept: MRI IMAGING | Age: 79
End: 2019-08-21
Attending: EMERGENCY MEDICINE
Payer: MEDICARE

## 2019-08-21 LAB
APPEARANCE UR: CLEAR
BACTERIA URNS QL MICRO: NEGATIVE /HPF
BILIRUB UR QL: NEGATIVE
COLOR UR: YELLOW
EPITH CASTS URNS QL MICRO: NORMAL /LPF (ref 0–5)
GLUCOSE UR STRIP.AUTO-MCNC: NEGATIVE MG/DL
HGB UR QL STRIP: NEGATIVE
KETONES UR QL STRIP.AUTO: NEGATIVE MG/DL
LEUKOCYTE ESTERASE UR QL STRIP.AUTO: ABNORMAL
NITRITE UR QL STRIP.AUTO: NEGATIVE
PH UR STRIP: 6.5 [PH] (ref 5–8)
PROT UR STRIP-MCNC: ABNORMAL MG/DL
RBC #/AREA URNS HPF: NORMAL /HPF (ref 0–5)
SP GR UR REFRACTOMETRY: 1.02 (ref 1–1.03)
UROBILINOGEN UR QL STRIP.AUTO: 0.2 EU/DL (ref 0.2–1)
WBC URNS QL MICRO: NORMAL /HPF (ref 0–4)

## 2019-08-21 PROCEDURE — 74011636320 HC RX REV CODE- 636/320: Performed by: EMERGENCY MEDICINE

## 2019-08-21 PROCEDURE — A9575 INJ GADOTERATE MEGLUMI 0.1ML: HCPCS | Performed by: EMERGENCY MEDICINE

## 2019-08-21 PROCEDURE — 72158 MRI LUMBAR SPINE W/O & W/DYE: CPT

## 2019-08-21 RX ADMIN — GADOTERATE MEGLUMINE 15 ML: 376.9 INJECTION INTRAVENOUS at 01:00

## 2019-08-21 NOTE — ED NOTES
The documentation for this period is being entered following the guidelines as defined in the [facility] downtime policy by Indra Prabhakar RN. Patient was discharged by [facility] and documentation placed in downtime documents.

## 2019-08-21 NOTE — ED PROVIDER NOTES
Cristel Perez is a 69 y.o. Female with history of hypertension and no recent injury or compression to her sacral area with complaints of numbness in her perirectal and vaginal area since about 3:00 today. Patient states she was intermittently sitting on a stool but had no prolonged pressure on the area. Patient denies any incontinence. She is on clear whether she is emptying her bladder not. She denies any difficulty walking, other numbness or tingling or weakness. She has no recent back pain. She has no history of immunocompromise disease. There is no headache or neck pain or other symptoms. She has no sensation when she wipes on the    The history is provided by the patient.         Past Medical History:   Diagnosis Date    Arthritis     bilateral knee pain    Calculus of kidney     HTN (hypertension)     Hypercholesterolemia     Menopause        Past Surgical History:   Procedure Laterality Date    COLONOSCOPY N/A 2017    COLONOSCOPY performed by Danielle Clemente MD at [facility] ENDOSCOPY    HX CHOLECYSTECTOMY      HX COLONOSCOPY      HX WISDOM TEETH EXTRACTION  19's         Family History:   Problem Relation Age of Onset    Hypertension Brother        Social History     Socioeconomic History    Marital status:      Spouse name: Not on file    Number of children: Not on file    Years of education: Not on file    Highest education level: Not on file   Occupational History    Not on file   Social Needs    Financial resource strain: Not on file    Food insecurity:     Worry: Not on file     Inability: Not on file    Transportation needs:     Medical: Not on file     Non-medical: Not on file   Tobacco Use    Smoking status: Former Smoker     Last attempt to quit: 1994     Years since quittin.6    Smokeless tobacco: Never Used   Substance and Sexual Activity    Alcohol use: No    Drug use: No    Sexual activity: Not Currently   Lifestyle    Physical activity:     Days per week: Not on file     Minutes per session: Not on file    Stress: Not on file   Relationships    Social connections:     Talks on phone: Not on file     Gets together: Not on file     Attends [employer] service: Not on file     Active member of club or organization: Not on file     Attends meetings of clubs or organizations: Not on file     Relationship status: Not on file    Intimate partner violence:     Fear of current or ex partner: Not on file     Emotionally abused: Not on file     Physically abused: Not on file     Forced sexual activity: Not on file   Other Topics Concern    Not on file   Social History Narrative    Not on file         ALLERGIES: Atenolol; Pcn [penicillins]; and Vicodin [hydrocodone-acetaminophen]    Review of Systems   Constitutional: Negative for fever. HENT: Negative for sore throat and trouble swallowing. Eyes: Negative for visual disturbance. Respiratory: Negative for shortness of breath. Cardiovascular: Negative for chest pain. Gastrointestinal: Negative for abdominal pain. Genitourinary: Negative for difficulty urinating and dysuria. Musculoskeletal: Negative for gait problem. Skin: Negative for rash. Neurological: Positive for numbness. Psychiatric/Behavioral: Negative for sleep disturbance. Vitals:    08/20/19 2214   BP: 163/88   Pulse: 94   Resp: 15   Temp: 97.9 °F (36.6 °C)   SpO2: 94%   Weight: 84.4 kg (186 lb)            Physical Exam   Constitutional: She is oriented to person, place, and time. She appears well-developed and well-nourished. No distress. HENT:   Head: Normocephalic and atraumatic. Right Ear: External ear normal.   Left Ear: External ear normal.   Nose: Nose normal.   Mouth/Throat: Uvula is midline, oropharynx is clear and moist and mucous membranes are normal.   Eyes: Conjunctivae are normal. No scleral icterus. Neck: Neck supple. Cardiovascular: Normal rate, regular rhythm, normal heart sounds and intact distal pulses. Pulmonary/Chest: Effort normal and breath sounds normal.   Abdominal: Soft. There is no tenderness. Musculoskeletal: She exhibits no edema. Neurological: She is alert and oriented to person, place, and time. No cranial nerve deficit (3-12). Gait normal.   Normal sensation in all extremities abdomen chest and back. There is no weakness. She has no drift. Patient is insensate in the perirectal and perivaginal area including the groin. Patient does have some rectal tone but it feels slightly loose   Skin: Skin is warm and dry. She is not diaphoretic. Psychiatric: Her behavior is normal.   Nursing note and vitals reviewed. MDM       Procedures    Vitals:  Patient Vitals for the past 12 hrs:   Temp Pulse Resp BP SpO2   08/20/19 2214 97.9 °F (36.6 °C) 94 15 163/88 94 %         Medications ordered:   Medications   dexamethasone (DECADRON) 4 mg/mL injection 10 mg (has no administration in time range)         Lab findings:  Recent Results (from the past 12 hour(s))   CBC WITH AUTOMATED DIFF    Collection Time: 08/20/19 11:05 PM   Result Value Ref Range    WBC 7.4 4.6 - 13.2 K/uL    RBC 4.56 4.20 - 5.30 M/uL    HGB 13.2 12.0 - 16.0 g/dL    HCT 40.8 35.0 - 45.0 %    MCV 89.5 74.0 - 97.0 FL    MCH 28.9 24.0 - 34.0 PG    MCHC 32.4 31.0 - 37.0 g/dL    RDW 13.0 11.6 - 14.5 %    PLATELET 890 925 - 944 K/uL    MPV 11.0 9.2 - 11.8 FL    NEUTROPHILS 64 40 - 73 %    LYMPHOCYTES 29 21 - 52 %    MONOCYTES 6 3 - 10 %    EOSINOPHILS 1 0 - 5 %    BASOPHILS 0 0 - 2 %    ABS. NEUTROPHILS 4.8 1.8 - 8.0 K/UL    ABS. LYMPHOCYTES 2.1 0.9 - 3.6 K/UL    ABS. MONOCYTES 0.4 0.05 - 1.2 K/UL    ABS. EOSINOPHILS 0.1 0.0 - 0.4 K/UL    ABS.  BASOPHILS 0.0 0.0 - 0.1 K/UL    DF AUTOMATED     METABOLIC PANEL, COMPREHENSIVE    Collection Time: 08/20/19 11:05 PM   Result Value Ref Range    Sodium 141 136 - 145 mmol/L    Potassium 4.4 3.5 - 5.5 mmol/L    Chloride 101 100 - 111 mmol/L    CO2 38 (H) 21 - 32 mmol/L    Anion gap 2 (L) 3.0 - 18 mmol/L    Glucose 86 74 - 99 mg/dL    BUN 21 (H) 7.0 - 18 MG/DL    Creatinine 1.35 (H) 0.6 - 1.3 MG/DL    BUN/Creatinine ratio 16 12 - 20      GFR est AA 46 (L) >60 ml/min/1.73m2    GFR est non-AA 38 (L) >60 ml/min/1.73m2    Calcium 9.3 8.5 - 10.1 MG/DL    Bilirubin, total 0.3 0.2 - 1.0 MG/DL    ALT (SGPT) 28 13 - 56 U/L    AST (SGOT) 23 10 - 38 U/L    Alk. phosphatase 101 45 - 117 U/L    Protein, total 7.7 6.4 - 8.2 g/dL    Albumin 3.7 3.4 - 5.0 g/dL    Globulin 4.0 2.0 - 4.0 g/dL    A-G Ratio 0.9 0.8 - 1.7     URINALYSIS W/ RFLX MICROSCOPIC    Collection Time: 08/20/19 11:37 PM   Result Value Ref Range    Color YELLOW      Appearance CLEAR      Specific gravity 1.023 1.005 - 1.030      pH (UA) 6.5 5.0 - 8.0      Protein TRACE (A) NEG mg/dL    Glucose NEGATIVE  NEG mg/dL    Ketone NEGATIVE  NEG mg/dL    Bilirubin NEGATIVE  NEG      Blood NEGATIVE  NEG      Urobilinogen 0.2 0.2 - 1.0 EU/dL    Nitrites NEGATIVE  NEG      Leukocyte Esterase SMALL (A) NEG     URINE MICROSCOPIC ONLY    Collection Time: 08/20/19 11:37 PM   Result Value Ref Range    WBC 0 to 3 0 - 4 /hpf    RBC NONE 0 - 5 /hpf    Epithelial cells FEW 0 - 5 /lpf    Bacteria NEGATIVE  NEG /hpf       EKG interpretation by ED Physician:      X-Ray, CT or other radiology findings or impressions:  MRI LUMB SPINE W WO CONT    (Results Pending)   No acute fracture or subluxation. No infection. No soft tissue edema. No significant spinal canal stenosis at any level. There is multilevel mild disc bulging from L2-S1. Mild bilateral foraminal stenosis at L4-L5 L5-S1. Progress notes, Consult notes or additional Procedure notes:   Patient with saddle anesthesia with no history of significant trauma. Will get MRI to evaluate for cauda equina syndrome. patient was also given dose of steroids as well    Patient with no acute urinary retention or incontinence. Patient with no need for acute surgical consult.   This is probably likely secondary to the way patient had been sitting on the stool and likely will be transient. Patient was discharged during downtime with follow-up given as per scanned documentation    I have discussed with patient and/or family/sig other the results, interpretation of any imaging if performed, suspected diagnosis and treatment plan to include instructions regarding the diagnoses listed to which understanding was expressed with all questions answered      Reevaluation of patient:   stable    Disposition:  Diagnosis:   1. Saddle anesthesia        Disposition: home      Follow-up Information    None           Discharge Medication List as of 8/21/2019  4:11 AM      CONTINUE these medications which have NOT CHANGED    Details   budesonide-formoterol (SYMBICORT) 160-4.5 mcg/actuation HFAA Take 2 Puffs by inhalation two (2) times a day., Normal, Disp-3 Inhaler, R-3      albuterol (PROVENTIL HFA, VENTOLIN HFA, PROAIR HFA) 90 mcg/actuation inhaler Take 1 Puff by inhalation every four (4) hours as needed for Wheezing. Indications: chronic obstructive lung disease, Normal, Disp-1 Inhaler, R-3      dilTIAZem CD (DILT-CD) 240 mg ER capsule Take 1 Cap by mouth daily. Patient takes medicine daily every morning. Instructed pt to take this medicine early AM on March 11, 2013 with a small sip of water. Indications: high blood pressure, Print, Disp-90 Cap, R-3      hydroCHLOROthiazide (HYDRODIURIL) 12.5 mg tablet Take 1 Tab by mouth daily. , Normal, Disp-90 Tab, R-3      pravastatin (PRAVACHOL) 20 mg tablet Take 1 Tab by mouth nightly., Normal, Disp-90 Tab, R-3      docusate sodium (STOOL SOFTENER PO) Take  by mouth., Historical Med      cyanocobalamin-cobamamide (B-12 PLUS) 5,000-100 mcg sublingual tablet 1 Tab by SubLINGual route daily. , Historical Med      traMADol (ULTRAM) 50 mg tablet take 1 tablet by mouth once daily if needed, Historical Med, R-0      alendronate-vitamin d3 70-2,800 mg-unit per tablet Take 1 Tab by mouth every seven (7) days.   , Historical Med      aspirin 81 mg chewable tablet Take 81 mg by mouth daily. Patient took last dose March 04, 2013 and will her surgeon's instruction not to take it anymore before surgery. , Historical Med      calcium-cholecalciferol, d3, (CALCIUM 600 + D) 600-125 mg-unit Tab Take  by mouth.  , Historical Med      acetaminophen (TYLENOL) 325 mg tablet Take  by mouth every four (4) hours as needed.  , Historical Med

## 2019-08-21 NOTE — ED TRIAGE NOTES
Pt ambulatory to triage w/ c/o her buttocks and vagina feeling like they are \"asleep\" since 1500 today. Pt denies any incontinence/injury/pain/or leg involvement.

## 2019-08-22 LAB
BACTERIA SPEC CULT: NORMAL
SERVICE CMNT-IMP: NORMAL

## 2019-10-16 ENCOUNTER — HOSPITAL ENCOUNTER (OUTPATIENT)
Dept: MAMMOGRAPHY | Age: 79
Discharge: HOME | End: 2019-10-16
Attending: INTERNAL MEDICINE
Payer: MEDICARE

## 2019-10-16 DIAGNOSIS — Z12.39 BREAST CANCER SCREENING: ICD-10-CM

## 2019-10-16 PROCEDURE — 77063 BREAST TOMOSYNTHESIS BI: CPT

## 2020-02-27 RX ORDER — DILTIAZEM HYDROCHLORIDE 240 MG/1
240 CAPSULE, COATED, EXTENDED RELEASE ORAL DAILY
Qty: 90 CAP | Refills: 3 | OUTPATIENT
Start: 2020-02-27

## 2020-02-27 NOTE — TELEPHONE ENCOUNTER
Pharmacy fax request, last OV 7/29/19. Unknown if patient has a new PCP. Requested Prescriptions     Pending Prescriptions Disp Refills    dilTIAZem CD (DILT-CD) 240 mg ER capsule 90 Cap 3     Sig: Take 1 Cap by mouth daily. Patient takes medicine daily every morning. Instructed pt to take this medicine early AM on March 11, 2013 with a small sip of water.   Indications: high blood pressure

## 2020-10-19 ENCOUNTER — HOSPITAL ENCOUNTER (OUTPATIENT)
Dept: MAMMOGRAPHY | Age: 80
Discharge: HOME | End: 2020-10-19
Attending: INTERNAL MEDICINE
Payer: MEDICARE

## 2020-10-19 DIAGNOSIS — Z12.31 VISIT FOR SCREENING MAMMOGRAM: ICD-10-CM

## 2020-10-19 PROCEDURE — 77063 BREAST TOMOSYNTHESIS BI: CPT

## 2021-10-20 ENCOUNTER — HOSPITAL ENCOUNTER (OUTPATIENT)
Dept: WOMENS IMAGING | Age: 81
Discharge: HOME | End: 2021-10-20
Payer: MEDICARE

## 2021-10-20 DIAGNOSIS — Z12.31 VISIT FOR SCREENING MAMMOGRAM: ICD-10-CM

## 2021-10-20 PROCEDURE — 77063 BREAST TOMOSYNTHESIS BI: CPT

## 2022-09-26 ENCOUNTER — TRANSCRIBE ORDER (OUTPATIENT)
Dept: SCHEDULING | Age: 82
End: 2022-09-26

## 2022-09-26 DIAGNOSIS — Z12.31 VISIT FOR SCREENING MAMMOGRAM: Primary | ICD-10-CM

## 2022-10-21 ENCOUNTER — HOSPITAL ENCOUNTER (OUTPATIENT)
Dept: WOMENS IMAGING | Age: 82
Discharge: HOME | End: 2022-10-21
Attending: NURSE PRACTITIONER
Payer: MEDICARE

## 2022-10-21 DIAGNOSIS — Z12.31 VISIT FOR SCREENING MAMMOGRAM: ICD-10-CM

## 2022-10-21 PROCEDURE — 77063 BREAST TOMOSYNTHESIS BI: CPT

## 2023-01-28 ENCOUNTER — TRANSCRIBE ORDERS (OUTPATIENT)
Facility: HOSPITAL | Age: 83
End: 2023-01-28

## 2023-01-28 DIAGNOSIS — Z12.31 VISIT FOR SCREENING MAMMOGRAM: Primary | ICD-10-CM

## 2023-10-27 ENCOUNTER — HOSPITAL ENCOUNTER (OUTPATIENT)
Dept: WOMENS IMAGING | Facility: HOSPITAL | Age: 83
End: 2023-10-27
Payer: MEDICARE

## 2023-10-27 DIAGNOSIS — Z12.31 VISIT FOR SCREENING MAMMOGRAM: ICD-10-CM

## 2023-10-27 PROCEDURE — 77063 BREAST TOMOSYNTHESIS BI: CPT

## 2024-10-28 ENCOUNTER — HOSPITAL ENCOUNTER (OUTPATIENT)
Dept: WOMENS IMAGING | Facility: HOSPITAL | Age: 84
Discharge: HOME | End: 2024-10-31
Payer: MEDICARE

## 2024-10-28 DIAGNOSIS — Z12.31 SCREENING MAMMOGRAM FOR BREAST CANCER: ICD-10-CM

## 2024-10-28 PROCEDURE — 77063 BREAST TOMOSYNTHESIS BI: CPT

## (undated) DEVICE — TRAP SPEC COLL POLYP POLYSTYR --

## (undated) DEVICE — SNARE POLYP OVAL TIP 30X55MM -- LARIAT

## (undated) DEVICE — KIT COLON W/ 1.1OZ LUB AND 2 END

## (undated) DEVICE — BASIN EMESIS 500CC ROSE 250/CS 60/PLT: Brand: MEDEGEN MEDICAL PRODUCTS, LLC

## (undated) DEVICE — CONTAINER PREFIL FRMLN 15ML --

## (undated) DEVICE — FLEX ADVANTAGE 1500CC: Brand: FLEX ADVANTAGE

## (undated) DEVICE — DEVICE INFL 60ML 12ATM CONVENIENT LOK REL HNDL HI PRSS FLX

## (undated) DEVICE — KENDALL 500 SERIES DIAPHORETIC FOAM MONITORING ELECTRODE - TEAR DROP SHAPE ( 30/PK): Brand: KENDALL

## (undated) DEVICE — MEDI-VAC NON-CONDUCTIVE SUCTION TUBING: Brand: CARDINAL HEALTH

## (undated) DEVICE — SYR 50ML SLIP TIP NSAF LF STRL --